# Patient Record
Sex: FEMALE | Race: BLACK OR AFRICAN AMERICAN | NOT HISPANIC OR LATINO | Employment: PART TIME | URBAN - NONMETROPOLITAN AREA
[De-identification: names, ages, dates, MRNs, and addresses within clinical notes are randomized per-mention and may not be internally consistent; named-entity substitution may affect disease eponyms.]

---

## 2018-05-04 ENCOUNTER — HOSPITAL ENCOUNTER (INPATIENT)
Facility: HOSPITAL | Age: 23
LOS: 2 days | Discharge: HOME | End: 2018-05-06
Attending: OBSTETRICS & GYNECOLOGY | Admitting: OBSTETRICS & GYNECOLOGY

## 2018-05-04 PROBLEM — O47.9 THREATENED LABOR AT TERM: Status: RESOLVED | Noted: 2018-05-04 | Resolved: 2018-05-04

## 2018-05-04 PROBLEM — O47.9 THREATENED LABOR AT TERM: Status: ACTIVE | Noted: 2018-05-04

## 2018-05-04 LAB
ABO GROUP BLD: NORMAL
AMPHET+METHAMPHET UR QL: NEGATIVE
ANTI-E: NORMAL
BARBITURATES UR QL SCN: NEGATIVE
BENZODIAZ UR QL SCN: NEGATIVE
BLD GP AB SCN SERPL QL: POSITIVE
CANNABINOIDS SERPL QL: POSITIVE
COCAINE UR QL: NEGATIVE
DEPRECATED RDW RBC AUTO: 39.4 FL (ref 36.4–46.3)
ERYTHROCYTE [DISTWIDTH] IN BLOOD BY AUTOMATED COUNT: 13.7 % (ref 11.5–14.5)
HCT VFR BLD AUTO: 28.5 % (ref 35–45)
HGB BLD-MCNC: 9.5 G/DL (ref 12–15.5)
Lab: NORMAL
MCH RBC QN AUTO: 26.3 PG (ref 26.5–34)
MCHC RBC AUTO-ENTMCNC: 33.3 G/DL (ref 31.4–36)
MCV RBC AUTO: 78.9 FL (ref 80–98)
METHADONE UR QL SCN: NEGATIVE
OPIATES UR QL: NEGATIVE
OXYCODONE UR QL SCN: NEGATIVE
PLATELET # BLD AUTO: 200 10*3/MM3 (ref 150–450)
PMV BLD AUTO: 11 FL (ref 8–12)
RBC # BLD AUTO: 3.61 10*6/MM3 (ref 3.77–5.16)
RH BLD: POSITIVE
T&S EXPIRATION DATE: NORMAL
WBC NRBC COR # BLD: 11.8 10*3/MM3 (ref 3.2–9.8)

## 2018-05-04 PROCEDURE — 80307 DRUG TEST PRSMV CHEM ANLYZR: CPT | Performed by: ADVANCED PRACTICE MIDWIFE

## 2018-05-04 PROCEDURE — 86900 BLOOD TYPING SEROLOGIC ABO: CPT | Performed by: ADVANCED PRACTICE MIDWIFE

## 2018-05-04 PROCEDURE — 86870 RBC ANTIBODY IDENTIFICATION: CPT | Performed by: ADVANCED PRACTICE MIDWIFE

## 2018-05-04 PROCEDURE — 86901 BLOOD TYPING SEROLOGIC RH(D): CPT | Performed by: ADVANCED PRACTICE MIDWIFE

## 2018-05-04 PROCEDURE — 86850 RBC ANTIBODY SCREEN: CPT | Performed by: ADVANCED PRACTICE MIDWIFE

## 2018-05-04 PROCEDURE — 59410 OBSTETRICAL CARE: CPT | Performed by: ADVANCED PRACTICE MIDWIFE

## 2018-05-04 PROCEDURE — 85027 COMPLETE CBC AUTOMATED: CPT | Performed by: ADVANCED PRACTICE MIDWIFE

## 2018-05-04 RX ORDER — SODIUM CHLORIDE, SODIUM LACTATE, POTASSIUM CHLORIDE, CALCIUM CHLORIDE 600; 310; 30; 20 MG/100ML; MG/100ML; MG/100ML; MG/100ML
125 INJECTION, SOLUTION INTRAVENOUS CONTINUOUS
Status: DISCONTINUED | OUTPATIENT
Start: 2018-05-04 | End: 2018-05-05

## 2018-05-04 RX ORDER — IBUPROFEN 800 MG/1
800 TABLET ORAL EVERY 6 HOURS PRN
Status: DISCONTINUED | OUTPATIENT
Start: 2018-05-04 | End: 2018-05-06

## 2018-05-04 RX ORDER — DOCUSATE SODIUM 100 MG/1
100 CAPSULE, LIQUID FILLED ORAL 2 TIMES DAILY PRN
Status: DISCONTINUED | OUTPATIENT
Start: 2018-05-04 | End: 2018-05-06

## 2018-05-04 RX ORDER — SODIUM CHLORIDE 0.9 % (FLUSH) 0.9 %
1-10 SYRINGE (ML) INJECTION AS NEEDED
Status: DISCONTINUED | OUTPATIENT
Start: 2018-05-04 | End: 2018-05-06

## 2018-05-04 RX ORDER — LIDOCAINE HYDROCHLORIDE 10 MG/ML
5 INJECTION, SOLUTION EPIDURAL; INFILTRATION; INTRACAUDAL; PERINEURAL AS NEEDED
Status: DISCONTINUED | OUTPATIENT
Start: 2018-05-04 | End: 2018-05-04 | Stop reason: HOSPADM

## 2018-05-04 RX ORDER — SODIUM CHLORIDE 0.9 % (FLUSH) 0.9 %
1-10 SYRINGE (ML) INJECTION AS NEEDED
Status: DISCONTINUED | OUTPATIENT
Start: 2018-05-04 | End: 2018-05-04 | Stop reason: HOSPADM

## 2018-05-04 RX ORDER — ACETAMINOPHEN 325 MG/1
650 TABLET ORAL EVERY 4 HOURS PRN
Status: DISCONTINUED | OUTPATIENT
Start: 2018-05-04 | End: 2018-05-06

## 2018-05-04 RX ORDER — OXYTOCIN/RINGER'S LACTATE 20/1000 ML
1000 PLASTIC BAG, INJECTION (ML) INTRAVENOUS CONTINUOUS
Status: ACTIVE | OUTPATIENT
Start: 2018-05-04 | End: 2018-05-04

## 2018-05-04 RX ORDER — SODIUM CHLORIDE, SODIUM LACTATE, POTASSIUM CHLORIDE, CALCIUM CHLORIDE 600; 310; 30; 20 MG/100ML; MG/100ML; MG/100ML; MG/100ML
INJECTION, SOLUTION INTRAVENOUS
Status: DISPENSED
Start: 2018-05-04 | End: 2018-05-05

## 2018-05-04 RX ORDER — ACETAMINOPHEN 325 MG/1
650 TABLET ORAL EVERY 4 HOURS PRN
Status: DISCONTINUED | OUTPATIENT
Start: 2018-05-04 | End: 2018-05-04 | Stop reason: HOSPADM

## 2018-05-04 RX ADMIN — SODIUM CHLORIDE, POTASSIUM CHLORIDE, SODIUM LACTATE AND CALCIUM CHLORIDE 125 ML/HR: 600; 310; 30; 20 INJECTION, SOLUTION INTRAVENOUS at 20:00

## 2018-05-04 RX ADMIN — Medication 1000 ML/HR: at 20:25

## 2018-05-05 LAB
HCT VFR BLD AUTO: 28.8 % (ref 35–45)
HGB BLD-MCNC: 9.5 G/DL (ref 12–15.5)

## 2018-05-05 PROCEDURE — 85014 HEMATOCRIT: CPT | Performed by: ADVANCED PRACTICE MIDWIFE

## 2018-05-05 PROCEDURE — 85018 HEMOGLOBIN: CPT | Performed by: ADVANCED PRACTICE MIDWIFE

## 2018-05-05 RX ORDER — LANOLIN 100 %
OINTMENT (GRAM) TOPICAL AS NEEDED
Status: DISCONTINUED | OUTPATIENT
Start: 2018-05-05 | End: 2018-05-06

## 2018-05-05 RX ORDER — FERROUS SULFATE TAB EC 324 MG (65 MG FE EQUIVALENT) 324 (65 FE) MG
324 TABLET DELAYED RESPONSE ORAL
Status: DISCONTINUED | OUTPATIENT
Start: 2018-05-05 | End: 2018-05-06

## 2018-05-05 RX ORDER — MISOPROSTOL 200 UG/1
800 TABLET ORAL AS NEEDED
Status: DISCONTINUED | OUTPATIENT
Start: 2018-05-05 | End: 2018-05-06

## 2018-05-05 RX ORDER — ACETAMINOPHEN 325 MG/1
650 TABLET ORAL EVERY 4 HOURS PRN
Status: DISCONTINUED | OUTPATIENT
Start: 2018-05-05 | End: 2018-05-06

## 2018-05-05 RX ORDER — METHYLERGONOVINE MALEATE 0.2 MG/ML
200 INJECTION INTRAVENOUS ONCE AS NEEDED
Status: DISCONTINUED | OUTPATIENT
Start: 2018-05-05 | End: 2018-05-06

## 2018-05-05 RX ORDER — CARBOPROST TROMETHAMINE 250 UG/ML
250 INJECTION, SOLUTION INTRAMUSCULAR AS NEEDED
Status: DISCONTINUED | OUTPATIENT
Start: 2018-05-05 | End: 2018-05-06

## 2018-05-05 RX ADMIN — BENZOCAINE AND LEVOMENTHOL: 200; 5 SPRAY TOPICAL at 09:52

## 2018-05-05 RX ADMIN — IBUPROFEN 800 MG: 800 TABLET ORAL at 21:03

## 2018-05-05 RX ADMIN — FERROUS SULFATE TAB EC 324 MG (65 MG FE EQUIVALENT) 324 MG: 324 (65 FE) TABLET DELAYED RESPONSE at 13:58

## 2018-05-05 RX ADMIN — IBUPROFEN 800 MG: 800 TABLET ORAL at 14:01

## 2018-05-05 RX ADMIN — FERROUS SULFATE TAB EC 324 MG (65 MG FE EQUIVALENT) 324 MG: 324 (65 FE) TABLET DELAYED RESPONSE at 21:01

## 2018-05-05 RX ADMIN — HYDROCORTISONE 2.5% 1 APPLICATION: 25 CREAM TOPICAL at 18:28

## 2018-05-05 RX ADMIN — FERROUS SULFATE TAB EC 324 MG (65 MG FE EQUIVALENT) 324 MG: 324 (65 FE) TABLET DELAYED RESPONSE at 09:52

## 2018-05-05 RX ADMIN — Medication: at 18:28

## 2018-05-05 NOTE — H&P
"Isidra Gtz  : 1995  MRN: [id number]  CSN: [id number]    History and Physical    Subjective   Isidra Gtz is a 23 y.o. year old  with an Estimated Date of Delivery: 18 currently at 37w5d presenting with regular contractions occuring every 2-3.    Prenatal care has been with [facility].  It has been complicated by LAte/Inadequate prenatal care.    Obstetric History       T0      L2     SAB0   TAB0   Ectopic0   Molar0   Multiple0   Live Births2       # Outcome Date GA Lbr Tien/2nd Weight Sex Delivery Anes PTL Lv   3 Current            2  03/10/17    F Vag-Spont None Y ORALIA   1  04/10/16    M Vag-Spont None Y ORALIA          No past medical history on file.    No past surgical history on file.    Prior to Admission medications    Medication Sig Start Date End Date Taking? Authorizing Provider   Prenatal Multivit-Min-Fe-FA (PRENATAL VITAMINS) 0.8 MG tablet Take 1 tablet by mouth Daily.   Yes Historical Provider, MD       No Known Allergies    No family history on file.    Social History   Substance Use Topics   • Smoking status: Never Smoker   • Smokeless tobacco: Never Used   • Alcohol use No       Review of Systems   Constitutional: Negative.    Respiratory: Negative.    Cardiovascular: Negative.    Gastrointestinal: Negative.    Genitourinary: Negative.    Neurological: Negative.    Psychiatric/Behavioral: Negative.              Objective     /78 (BP Location: Right arm, Patient Position: Lying)   Pulse 93   Temp 98.2 °F (36.8 °C) (Oral)   Resp 18   Ht 154.9 cm (61\")   Wt 63.5 kg (140 lb)   SpO2 100%   BMI 26.45 kg/m²       Physical Exam   Constitutional: She is oriented to person, place, and time. She appears well-developed and well-nourished.   Genitourinary: Vagina normal and uterus normal.   HENT:   Head: Normocephalic and atraumatic.   Eyes: Pupils are equal, round, and reactive to light.   Neck: Normal range of motion. " Neck supple.   Cardiovascular: Normal rate, regular rhythm, normal heart sounds and intact distal pulses.    Pulmonary/Chest: Effort normal and breath sounds normal.   Abdominal: Soft. Bowel sounds are normal.   Musculoskeletal: Normal range of motion.   Neurological: She is alert and oriented to person, place, and time. She has normal reflexes.   Skin: Skin is warm and dry.   Psychiatric: She has a normal mood and affect.       Prenatal Labs  Lab Results   Component Value Date    HGB 11.0 (L) 2016    HEPBSAG Negative 2015    ABO O 2015    RH POS 2015    [id number] Non Reactive 2015    HEPCVIRUSABY Negative 2015       Current Labs Reviewed   Pertinent labs reviewed.       Assessment   1. IUP at 37w5d  2. Group B strep status: unknown  3. Late/Inadequate prenatal care- 1 visit     Plan   1. SROM, large amount clear fluid @ 6   2. Anticipate     EDI Odell  2018  8:01 PM

## 2018-05-05 NOTE — PLAN OF CARE
Problem: Patient Care Overview  Goal: Plan of Care Review  Outcome: Ongoing (interventions implemented as appropriate)   05/05/18 0337   Coping/Psychosocial   Plan of Care Reviewed With patient     Goal: Individualization and Mutuality  Outcome: Ongoing (interventions implemented as appropriate)    Goal: Discharge Needs Assessment  Outcome: Ongoing (interventions implemented as appropriate)    Goal: Interprofessional Rounds/Family Conf  Outcome: Ongoing (interventions implemented as appropriate)      Problem: Breastfeeding (Adult,Obstetrics,Pediatric)  Goal: Signs and Symptoms of Listed Potential Problems Will be Absent, Minimized or Managed (Breastfeeding)  Outcome: Ongoing (interventions implemented as appropriate)      Problem: Postpartum (Vaginal Delivery) (Adult,Obstetrics,Pediatric)  Goal: Signs and Symptoms of Listed Potential Problems Will be Absent, Minimized or Managed (Postpartum)  Outcome: Ongoing (interventions implemented as appropriate)

## 2018-05-05 NOTE — PROGRESS NOTES
[facility]  Isidra Gtz  : 1995  MRN: [id number]  CSN: [id number]    Postpartum Day #1  Subjective   The patient has no complaints      Objective     Min/max vitals past 24 hours:   Temp  Min: 97.8 °F (36.6 °C)  Max: 98.2 °F (36.8 °C)  BP  Min: 115/73  Max: 135/64  Pulse  Min: 61  Max: 101  Pulse  Min: 61  Max: 101        Abdomen: soft, non-tender; bowel sounds normal; no masses   fundus firm and non-tender   Calves: Nontender, no cords palpable   Pelvic: deferred     Lab Results   Component Value Date    WBC 11.80 (H) 2018    HGB 9.5 (L) 2018    HCT 28.8 (L) 2018    MCV 78.9 (L) 2018     2018    RH Positive 2018    HEPBSAG Negative 2015        Assessment   1. Postpartum Day #1 S/P vaginal delivery  2. BF support     Plan   1. Continue routine postpartum care      This document has been electronically signed by EDI Odell on May 5, 2018 9:17 AM

## 2018-05-05 NOTE — PLAN OF CARE
Problem: Patient Care Overview  Goal: Plan of Care Review  Outcome: Ongoing (interventions implemented as appropriate)   05/05/18 8692   Coping/Psychosocial   Plan of Care Reviewed With patient   Plan of Care Review   Progress improving   OTHER   Outcome Summary vss; fundus firm, lochia rubra light; voids and ambulates. bonding well with infant.      Goal: Individualization and Mutuality  Outcome: Ongoing (interventions implemented as appropriate)    Goal: Discharge Needs Assessment  Outcome: Ongoing (interventions implemented as appropriate)    Goal: Interprofessional Rounds/Family Conf  Outcome: Ongoing (interventions implemented as appropriate)      Problem: Breastfeeding (Adult,Obstetrics,Pediatric)  Goal: Signs and Symptoms of Listed Potential Problems Will be Absent, Minimized or Managed (Breastfeeding)  Outcome: Ongoing (interventions implemented as appropriate)      Problem: Postpartum (Vaginal Delivery) (Adult,Obstetrics,Pediatric)  Goal: Signs and Symptoms of Listed Potential Problems Will be Absent, Minimized or Managed (Postpartum)  Outcome: Ongoing (interventions implemented as appropriate)

## 2018-05-05 NOTE — L&D DELIVERY NOTE
[facility]  Vaginal Delivery Note    Delivery     Delivery: Vaginal, Spontaneous Delivery     YOB: 2017    Time of Birth: 8:21 PM      Anesthesia: None     Delivering clinician: Dalila Laguna       Delivery narrative:  Patient is a now  who presented at 37w5d weeks gestation for regular contractions. Course of labor was uncomplicated. Cervix was 3-4 cm on admission. She received nothing for pain control in her labor. She had artificial  rupture of membranes at . Cervix progressed to complete at . Patient proceeded to a spontaneous vaginal delivery of a viable female infant at , over a intact perineum. No nuchal noted. Shoulders delivered without difficulty. Lusty cry. Infant placed on maternal abdomen. After cessation of pulsating, cord was double-clamped and cut with 3 vessels noted. Cord blood specimen was obtained and sent to lab for cord blood profile. Placenta delivered spontaneously at  in Denise position. Fundus was firm to massage. Estimated blood loss: Est. Blood Loss (mL): 225 mL (Filed from Delivery Summary) (18). Inspection of vaginal wall, perineum, and vestibule revealed  no laceration. APGARS: 8   @ 1 minute / 9   @ 5 minutes. Infant weight: No birth weight on file. . Mother and baby enter recovery in stable condition.       Complications  none    Infant    Findings: female  infant     Infant observations: Weight: No birth weight on file.   Length:    in  Observations/Comments:         Apgars: 8   @ 1 minute /    9   @ 5 minutes     Placenta, Cord, and Fluid    Placenta delivered  Spontaneous  at     8:25 PM     Cord: 3 vessels  present.   Nuchal Cord?  no   Cord blood obtained: Yes    Cord gases obtained:  No      Repair    Episiotomy: None    Lacerations: No   Estimated Blood Loss: Est. Blood Loss (mL): 225 mL (Filed from Delivery Summary) (18)     Suture used for repair:      Disposition  Mother to Remain in LD  in stable  condition currently.  Baby to remains with mom  in stable condition currently.          This document has been electronically signed by EDI Odell on May 4, 2018 8:39 PM          This document has been electronically signed by EDI Odell on May 4, 2018 8:39 PM

## 2018-05-06 VITALS
WEIGHT: 140 LBS | OXYGEN SATURATION: 98 % | BODY MASS INDEX: 26.43 KG/M2 | HEIGHT: 61 IN | DIASTOLIC BLOOD PRESSURE: 74 MMHG | TEMPERATURE: 98.2 F | RESPIRATION RATE: 16 BRPM | SYSTOLIC BLOOD PRESSURE: 132 MMHG | HEART RATE: 77 BPM

## 2018-05-06 RX ORDER — IBUPROFEN 800 MG/1
800 TABLET ORAL EVERY 8 HOURS PRN
Qty: 52 TABLET | Refills: 0 | Status: CANCELLED | OUTPATIENT
Start: 2018-05-06

## 2018-05-06 RX ORDER — LANOLIN 100 %
OINTMENT (GRAM) TOPICAL AS NEEDED
Status: CANCELLED
Start: 2018-05-06

## 2018-05-06 RX ORDER — ACETAMINOPHEN 325 MG/1
650 TABLET ORAL EVERY 4 HOURS PRN
Status: CANCELLED
Start: 2018-05-06

## 2018-05-06 RX ORDER — FERROUS SULFATE TAB EC 324 MG (65 MG FE EQUIVALENT) 324 (65 FE) MG
324 TABLET DELAYED RESPONSE ORAL
Qty: 90 TABLET | Refills: 10 | Status: SHIPPED | OUTPATIENT
Start: 2018-05-06 | End: 2018-05-17 | Stop reason: HOSPADM

## 2018-05-06 RX ORDER — FERROUS SULFATE TAB EC 324 MG (65 MG FE EQUIVALENT) 324 (65 FE) MG
324 TABLET DELAYED RESPONSE ORAL
Qty: 90 TABLET | Refills: 10 | Status: CANCELLED | OUTPATIENT
Start: 2018-05-06

## 2018-05-06 RX ORDER — FERROUS SULFATE TAB EC 324 MG (65 MG FE EQUIVALENT) 324 (65 FE) MG
324 TABLET DELAYED RESPONSE ORAL
Qty: 90 TABLET | Refills: 2 | Status: CANCELLED | OUTPATIENT
Start: 2018-05-06

## 2018-05-06 RX ORDER — IBUPROFEN 800 MG/1
800 TABLET ORAL EVERY 8 HOURS PRN
Qty: 52 TABLET | Refills: 0 | Status: SHIPPED | OUTPATIENT
Start: 2018-05-06 | End: 2018-05-17 | Stop reason: HOSPADM

## 2018-05-06 RX ADMIN — FERROUS SULFATE TAB EC 324 MG (65 MG FE EQUIVALENT) 324 MG: 324 (65 FE) TABLET DELAYED RESPONSE at 09:02

## 2018-05-06 NOTE — CONSULTS
SW received consult re: positive UDS on admission. Pt was positive on admission for THC. Baby's UDS negative. Pending meconium results. SW spoke with patient at bedside with FOB present in the bathroom. Pt agreeable for FOB to stay in room at time of conversation. Pt knew why SW was consulted. SW explained DCBS referral if meconium was positive. Informed her UDS on baby negative. She voiced understanding. Pt voiced that she did not take anything else that was not prx during pregnancy. SW informed her that SW will call with meconium results when they came back. Pt can be reached at [phone]. Pt had no other concerns. Pt has everything she needs for baby. Currently breastfeeding and bonding well with baby. SW will leave handoff for other SW to follow meconium results.    JIM Tan

## 2018-05-06 NOTE — DISCHARGE INSTR - ACTIVITY
Notify Physician or CNM of heavy bleeding, passing clots or foul odor to your discharge  Temp above 100.4. Burning on urination, gapping or drainage from incision or episiotomy, pain not relieved by your pain meds, redness or streaking in your breast or pain in your legs.  Take medication as prescribed  Continue taking your iron or vitamins till your refills run out or as long as you are breastfeeding  Take several rest periods during the day  Baby blues are normal, and may be present around the 3rd-4th day, if they last longer than 2-3 days contact your physician.  Pelvic Rest- No douching tampons or intercourse for 6 weeks  No lifting anything heavier than the baby  Wear a good supportive bra 24 hrs a day to prevent engorgement.  No driving the 1st 2 weeks or as long as you are taking pain meds

## 2018-05-07 NOTE — PAYOR COMM NOTE
"Sheryl Remy (23 y.o. Female)     Date of Birth Social Security Number Address Home Phone MRN    1995  [address] [phone] [id number]    Gnosticism Marital Status          None Single       Admission Date Admission Type Admitting Provider Attending Provider Department, Room/Bed    5/4/18 Elective Cayden Davila MD  [facility] MOTHER BABY, M754/1    Discharge Date Discharge Disposition Discharge Destination        5/6/2018 Home or Self Care Home             Attending Provider:  (none)   Allergies:  No Known Allergies    Isolation:  None   Infection:  None   Code Status:  Prior    Ht:  154.9 cm (61\")   Wt:  63.5 kg (140 lb)    Admission Cmt:  None   Principal Problem:  None                Active Insurance as of 5/4/2018     Primary Coverage     Payor Plan Insurance Group Employer/Plan Group    [employer] [place] [employer]      Payor Plan Address Payor Plan Phone Number Effective From Effective To    [address] [address] [phone] 5/4/2018     [place] [address]       Subscriber Name Subscriber Birth Date Member ID       SHERYL REMY 1995 [id number]                 Emergency Contacts      (Rel.) Home Phone Work Phone Mobile Phone    RandallKendrick (Significant Other) [phone] -- [phone]        Evelin Moreno RNCM  [facility]  [phone]   Phone  [phone]    Fax            ICU Vital Signs     Row Name 05/06/18 1021 05/06/18 0902 05/06/18 0530 05/05/18 2059 05/05/18 1824       Vitals    Temp  -- 98.2 °F (36.8 °C) 98.4 °F (36.9 °C) 97 °F (36.1 °C) 97.7 °F (36.5 °C)    Temp src  -- Temporal Artery  Oral Oral Oral    Pulse  -- 77 65 70 65    Heart Rate Source  -- Monitor Monitor Monitor Monitor    Resp  -- 16 16 18 16    Resp Rate Source  --  -- Visual Visual Visual    BP  -- 132/74 129/73 134/75 137/67    BP Location  -- Right arm Right arm Left arm Right arm    BP Method  -- Automatic " "Automatic Automatic Automatic    Patient Position  -- Sitting Sitting Sitting Sitting       Oxygen Therapy    SpO2  -- 98 % 98 % 98 % 99 %    Pulse Oximetry Type  --  -- Intermittent Intermittent Intermittent    Device (Oxygen Therapy) room air room air room air room air room air    Row Name 05/05/18 1357 05/05/18 0950 05/05/18 0900 05/05/18 0516 05/04/18 2249       Vitals    Temp 97.9 °F (36.6 °C) 97.5 °F (36.4 °C)  -- 98.1 °F (36.7 °C) 98 °F (36.7 °C)    Temp src  -- Oral  -- Oral Oral    Pulse 65 69  -- 70 76    Heart Rate Source Monitor Monitor  -- Monitor Monitor    Resp 18 16  -- 16 18    Resp Rate Source Visual Visual  -- Visual Monitor    /90 112/73  -- 115/73 116/69    BP Location Right arm Left arm  --  -- Left arm    BP Method Automatic Automatic  --  -- Automatic    Patient Position Sitting Sitting  --  -- Lying       Oxygen Therapy    SpO2 98 % 98 %  -- 98 % 99 %    Pulse Oximetry Type  --  --  -- Intermittent  --    Device (Oxygen Therapy) room air room air room air room air  --    Row Name 05/04/18 2226 05/04/18 2212 05/04/18 2156 05/04/18 2141 05/04/18 2126       Vitals    Pulse 75 77 69 61 77    /84 133/82 119/68 128/72 125/59    Row Name 05/04/18 2113 05/04/18 2056 05/04/18 2041 05/04/18 2028 05/04/18 2014       Vitals    Temp 97.8 °F (36.6 °C)  --  --  --  --    Temp src Oral  --  --  --  --    Pulse 86 83 94 101 97    Heart Rate Source Monitor  --  --  --  --    Resp 18  --  --  --  --    Resp Rate Source Visual  --  --  --  --    /64 127/64 129/62 134/72 133/75    BP Location Right arm  --  --  --  --    BP Method Automatic  --  --  --  --    Patient Position Sitting  --  --  --  --    Row Name 05/04/18 1923 05/04/18 1922                Height and Weight    Height  -- 154.9 cm (61\")       Height Method  -- Stated       Weight  -- 63.5 kg (140 lb)       Weight Method  -- Stated       Ideal Body Weight (IBW) (kg)  -- 48.15       BSA (Calculated - sq m)  -- 1.62 sq meters       " BMI (Calculated)  -- 26.5       Weight in (lb) to have BMI = 25  -- 132          Vitals    Temp  -- 98.2 °F (36.8 °C)       Temp src  -- Oral       Pulse 98 93       Heart Rate Source  -- Monitor       Resp  -- 18       Resp Rate Source  -- Visual       BP  -- 130/78       BP Location  -- Right arm       BP Method  -- Automatic       Patient Position  -- Lying          Oxygen Therapy    SpO2 100 % 100 %           Hospital Medications (all)       Dose Frequency Start End    Oxytocin-Lactated Ringers (PITOCIN) 20 UNIT/L in lactated Ringer's 1000 mL IVPB 1,000 mL/hr Continuous 5/4/2018 5/4/2018    Sig - Route: Infuse 20 Units/hr into a venous catheter Continuous. - Intravenous    acetaminophen (TYLENOL) tablet 650 mg (Discontinued) 650 mg Every 4 Hours PRN 5/4/2018 5/4/2018    Sig - Route: Take 2 tablets by mouth Every 4 (Four) Hours As Needed for Mild Pain  or Headache. - Oral    Reason for Discontinue: Patient Transfer    acetaminophen (TYLENOL) tablet 650 mg (Discontinued) 650 mg Every 4 Hours PRN 5/5/2018 5/6/2018    Sig - Route: Take 2 tablets by mouth Every 4 (Four) Hours As Needed for Mild Pain  or Headache. - Oral    acetaminophen (TYLENOL) tablet 650 mg (Discontinued) 650 mg Every 4 Hours PRN 5/4/2018 5/6/2018    Sig - Route: Take 2 tablets by mouth Every 4 (Four) Hours As Needed for Mild Pain . - Oral    benzocaine-lanolin-aloe vera (DERMOPLAST) 20-0.5 % topical spray (Discontinued)  As Needed 5/4/2018 5/6/2018    Sig - Route: Apply  topically As Needed for Mild Pain  (perineal pain). - Topical    carboprost (HEMABATE) injection 250 mcg (Discontinued) 250 mcg As Needed 5/5/2018 5/6/2018    Sig - Route: Inject 1 mL into the shoulder, thigh, or buttocks As Needed (hemorrhage). - Intramuscular    docusate sodium (COLACE) capsule 100 mg (Discontinued) 100 mg 2 Times Daily PRN 5/4/2018 5/6/2018    Sig - Route: Take 1 capsule by mouth 2 (Two) Times a Day As Needed for Constipation. - Oral    ferrous sulfate EC  tablet 324 mg (Discontinued) 324 mg 3 Times Daily With Meals 5/5/2018 5/6/2018    Sig - Route: Take 1 tablet by mouth 3 (Three) Times a Day With Meals. - Oral    hydrocortisone (ANUSOL-HC) 2.5 % rectal cream 1 application (Discontinued) 1 application As Needed 5/4/2018 5/6/2018    Sig - Route: Insert 1 application into the rectum As Needed for Hemorrhoids. - Rectal    ibuprofen (ADVIL,MOTRIN) tablet 800 mg (Discontinued) 800 mg Every 6 Hours PRN 5/4/2018 5/6/2018    Sig - Route: Take 1 tablet by mouth Every 6 (Six) Hours As Needed for Mild Pain . - Oral    lactated ringers bolus 1,000 mL (Discontinued) 1,000 mL Once As Needed 5/4/2018 5/4/2018    Sig - Route: Infuse 1,000 mL into a venous catheter 1 (One) Time As Needed (epidural). - Intravenous    Reason for Discontinue: Patient Transfer    lactated ringers infusion (Discontinued) 125 mL/hr Continuous 5/4/2018 5/5/2018    Sig - Route: Infuse 125 mL/hr into a venous catheter Continuous. - Intravenous    lanolin ointment (Discontinued)  As Needed 5/5/2018 5/6/2018    Sig - Route: Apply  topically As Needed for Dry Skin. - Topical    lidocaine PF 1% (XYLOCAINE) injection 5 mL (Discontinued) 5 mL As Needed 5/4/2018 5/4/2018    Sig - Route: Inject 5 mL into the skin As Needed (IV starts). - Intradermal    Reason for Discontinue: Patient Transfer    magnesium hydroxide (MILK OF MAGNESIA) suspension 2400 mg/10mL 10 mL (Discontinued) 10 mL Daily PRN 5/4/2018 5/6/2018    Sig - Route: Take 10 mL by mouth Daily As Needed for Constipation. - Oral    methylergonovine (METHERGINE) injection 200 mcg (Discontinued) 200 mcg Once As Needed 5/5/2018 5/6/2018    Sig - Route: Inject 1 mL into the shoulder, thigh, or buttocks 1 (One) Time As Needed (hemorrhage). - Intramuscular    misoprostol (CYTOTEC) tablet 800 mcg (Discontinued) 800 mcg As Needed 5/5/2018 5/6/2018    Sig - Route: Insert 4 tablets into the rectum As Needed (Postpartum Hemorrhage). - Rectal    pramoxine-hydrocortisone  1-1 % foam (Discontinued)  As Needed 2018    Sig - Route: Apply  topically As Needed for Hemorrhoids. - Topical    sodium chloride 0.9 % flush 1-10 mL (Discontinued) 1-10 mL As Needed 2018    Sig - Route: Infuse 1-10 mL into a venous catheter As Needed for Line Care. - Intravenous    Reason for Discontinue: Patient Transfer    sodium chloride 0.9 % flush 1-10 mL (Discontinued) 1-10 mL As Needed 2018    Sig - Route: Infuse 1-10 mL into a venous catheter As Needed for Line Care. - Intravenous    Tdap (BOOSTRIX) injection 0.5 mL (Discontinued) 0.5 mL During Hospitalization 2018    Sig - Route: Inject 0.5 mL into the shoulder, thigh, or buttocks During Hospitalization for Immunization. - Intramuscular             Operative/Procedure Notes (last 7 days) (Notes from 2018  7:18 AM through 2018  7:18 AM)      Dalila Laguna, APRN at 2018  8:39 PM          [facility]  Vaginal Delivery Note    Delivery     Delivery: Vaginal, Spontaneous Delivery     YOB: 2017    Time of Birth: 8:21 PM      Anesthesia: None     Delivering clinician: Dalila Laguna       Delivery narrative:  Patient is a now  who presented at 37w5d weeks gestation for regular contractions. Course of labor was uncomplicated. Cervix was 3-4 cm on admission. She received nothing for pain control in her labor. She had artificial  rupture of membranes at . Cervix progressed to complete at . Patient proceeded to a spontaneous vaginal delivery of a viable female infant at , over a intact perineum. No nuchal noted. Shoulders delivered without difficulty. Lusty cry. Infant placed on maternal abdomen. After cessation of pulsating, cord was double-clamped and cut with 3 vessels noted. Cord blood specimen was obtained and sent to lab for cord blood profile. Placenta delivered spontaneously at  in Denise position. Fundus was firm to massage. Estimated blood loss:  Est. Blood Loss (mL): 225 mL (Filed from Delivery Summary) (18). Inspection of vaginal wall, perineum, and vestibule revealed  no laceration. APGARS: 8   @ 1 minute / 9   @ 5 minutes. Infant weight: No birth weight on file. . Mother and baby enter recovery in stable condition.       Complications  none    Infant    Findings: female  infant     Infant observations: Weight: No birth weight on file.   Length:    in  Observations/Comments:         Apgars: 8   @ 1 minute /    9   @ 5 minutes     Placenta, Cord, and Fluid    Placenta delivered  Spontaneous  at     8:25 PM     Cord: 3 vessels  present.   Nuchal Cord?  no   Cord blood obtained: Yes    Cord gases obtained:  No      Repair    Episiotomy: None    Lacerations: No   Estimated Blood Loss: Est. Blood Loss (mL): 225 mL (Filed from Delivery Summary) (18)     Suture used for repair:      Disposition  Mother to Remain in LD  in stable condition currently.  Baby to remains with mom  in stable condition currently.          This document has been electronically signed by EDI Odell on May 4, 2018 8:39 PM          This document has been electronically signed by EDI Odell on May 4, 2018 8:39 PM        Electronically signed by EDI Odell at 2018  9:06 AM          Physician Progress Notes (last 7 days) (Notes from 2018  7:19 AM through 2018  7:19 AM)      EDI Odell at 2018  9:16 AM          [facility]  Isdirabrandy Rahmanron Gtz  : 1995  MRN: [id number]  CSN: [id number]    Postpartum Day #1  Subjective   The patient has no complaints     Objective     Min/max vitals past 24 hours:   Temp  Min: 97.8 °F (36.6 °C)  Max: 98.2 °F (36.8 °C)  BP  Min: 115/73  Max: 135/64  Pulse  Min: 61  Max: 101  Pulse  Min: 61  Max: 101        Abdomen: soft, non-tender; bowel sounds normal; no masses   fundus firm and non-tender   Calves: Nontender, no cords palpable   Pelvic: deferred     Lab  Results   Component Value Date    WBC 11.80 (H) 05/04/2018    HGB 9.5 (L) 05/05/2018    HCT 28.8 (L) 05/05/2018    MCV 78.9 (L) 05/04/2018     05/04/2018    RH Positive 05/04/2018    HEPBSAG Negative 11/09/2015       Assessment   1. Postpartum Day #1 S/P vaginal delivery  2. BF support    Plan   1. Continue routine postpartum care      This document has been electronically signed by DEI Odell on May 5, 2018 9:17 AM            Electronically signed by EDI Odell at 5/5/2018  9:17 AM       Medical Student Notes (last 7 days) (Notes from 4/30/2018  7:19 AM through 5/7/2018  7:19 AM)     No notes of this type exist for this encounter.        Consult Notes (last 7 days) (Notes from 04/30/18 through 05/07/18)     No notes of this type exist for this encounter.

## 2018-05-07 NOTE — PAYOR COMM NOTE
"Sheryl Remy (23 y.o. Female)     Date of Birth Social Security Number Address Home Phone MRN    1995  [address] [address] [phone] [id number]    [place] Marital Status          None Single       Admission Date Admission Type Admitting Provider Attending Provider Department, Room/Bed    5/4/18 Elective Cayden Davila MD  [facility] MOTHER BABY, M754/1    Discharge Date Discharge Disposition Discharge Destination        5/6/2018 Home or Self Care Home             Attending Provider:  (none)   Allergies:  No Known Allergies    Isolation:  None   Infection:  None   Code Status:  Prior    Ht:  154.9 cm (61\")   Wt:  63.5 kg (140 lb)    Admission Cmt:  None   Principal Problem:  None                Active Insurance as of 5/4/2018     Primary Coverage     Payor Plan Insurance Group Employer/Plan Group    [employer] [place]      Payor Plan Address Payor Plan Phone Number Effective From Effective To    [address] [address] [phone] 5/4/2018     [place] [address]       Subscriber Name Subscriber Birth Date Member ID       SHERYL REMY 1995 [id number]                 Emergency Contacts      (Rel.) Home Phone Work Phone Mobile Phone    Kendrick Pereira (Significant Other) [phone] -- [phone]        Evelin Moreno RNCM  [facility]  [phone]  Delivery Summary      "

## 2018-05-17 ENCOUNTER — OFFICE VISIT (OUTPATIENT)
Dept: OBSTETRICS AND GYNECOLOGY | Facility: CLINIC | Age: 23
End: 2018-05-17

## 2018-05-17 VITALS
HEIGHT: 61 IN | WEIGHT: 120 LBS | SYSTOLIC BLOOD PRESSURE: 108 MMHG | DIASTOLIC BLOOD PRESSURE: 73 MMHG | BODY MASS INDEX: 22.66 KG/M2

## 2018-05-17 DIAGNOSIS — Z30.09 ENCOUNTER FOR COUNSELING REGARDING CONTRACEPTION: ICD-10-CM

## 2018-05-17 PROCEDURE — 99024 POSTOP FOLLOW-UP VISIT: CPT | Performed by: ADVANCED PRACTICE MIDWIFE

## 2018-05-21 NOTE — PROGRESS NOTES
"Subjective   Chief Complaint   Patient presents with   • Postpartum Care     2 wk pp  w/ nothing for pain control of a female infant w/ a birth weight of 7-2.6     Isidra Gtz is a 23 y.o. year old  presenting for a postpartum visit.  She had a vaginal delivery.   Prenatal course was been complicated by Inadequate prenatal care.    Since delivery she has not been sexually active.  She does not have concerns about post-partum blues/depression.   She is breast feeding    The following portions of the patient's history were reviewed and updated as appropriate:current medications, allergies, past medical history, past social history and past surgical history    Smoking status: Never Smoker                                                              Smokeless tobacco: Never Used                          Review of Systems   Constitutional: Negative.    Respiratory: Negative.    Cardiovascular: Negative.    Gastrointestinal: Negative.    Genitourinary: Negative.    Musculoskeletal: Negative.    Skin: Negative.    Neurological: Negative.    Psychiatric/Behavioral: Negative.              Objective     /73   Ht 154.9 cm (61\")   Wt 54.4 kg (120 lb)   Breastfeeding? Yes   BMI 22.67 kg/m²     General:  well developed; well nourished  no acute distress   Abdomen: Not performed.   Pelvis: Not performed.        :    Problems Addressed this Visit     None      Visit Diagnoses     Routine postpartum follow-up    -  Primary    Encounter for counseling regarding contraception                Plan   1. Counseled on options for BC. Undecided, pamphlets given. Patient will call once she decides  2. RTC in 4 weeks for PP & initiation of BC         This note was electronically signed.    Dalila Laguna, APRN  May 21, 2018    "

## 2018-06-19 ENCOUNTER — OFFICE VISIT (OUTPATIENT)
Dept: OBSTETRICS AND GYNECOLOGY | Facility: CLINIC | Age: 23
End: 2018-06-19

## 2018-06-19 VITALS
DIASTOLIC BLOOD PRESSURE: 71 MMHG | HEART RATE: 65 BPM | WEIGHT: 126 LBS | HEIGHT: 61 IN | BODY MASS INDEX: 23.79 KG/M2 | SYSTOLIC BLOOD PRESSURE: 117 MMHG

## 2018-06-19 DIAGNOSIS — Z30.017 NEXPLANON INSERTION: ICD-10-CM

## 2018-06-19 PROCEDURE — 11981 INSERTION DRUG DLVR IMPLANT: CPT | Performed by: ADVANCED PRACTICE MIDWIFE

## 2018-06-19 PROCEDURE — 99024 POSTOP FOLLOW-UP VISIT: CPT | Performed by: ADVANCED PRACTICE MIDWIFE

## 2018-06-19 NOTE — PROGRESS NOTES
"Subjective   Chief Complaint   Patient presents with   • Postpartum Care     Isidra Gtz is a 23 y.o. year old  presenting for a postpartum visit.  She had a vaginal delivery.   Prenatal course was been complicated by minimal prenatal care.    Since delivery she has not been sexually active.  She does not have concerns about post-partum blues/depression.   She is breast feeding    The following portions of the patient's history were reviewed and updated as appropriate:current medications, allergies, past medical history, past social history and past surgical history    Smoking status: Never Smoker                                                              Smokeless tobacco: Never Used                          Review of Systems   Constitutional: Negative.    HENT: Negative.    Respiratory: Negative.    Cardiovascular: Negative.    Gastrointestinal: Negative.    Genitourinary: Negative.    Musculoskeletal: Negative.    Skin: Negative.    Neurological: Negative.    Psychiatric/Behavioral: Negative.              Objective     /71   Pulse 65   Ht 154.9 cm (61\")   Wt 57.2 kg (126 lb)   LMP 2018 (Approximate)   Breastfeeding? No   BMI 23.81 kg/m²     General:  well developed; well nourished  no acute distress   Abdomen: Not performed   Pelvis: Not performed.        :Nexplanon Insertion    Patient's last menstrual period was 2018 (approximate).    Date of procedure:  2018            NDC#: 3920-2221-59    Risks and benefits discussed? yes  All questions answered? yes  Consents given by the patient  Written consent obtained? yes    Local anesthesia used:  yes - 3 cc's of  Meds; anesthesia local: 1% lidocaine    Procedure documentation:    The upper left arm (non-dominant) was marked at the intended site of insertion.  Betadine was used to cleanse the skin.  Local anesthesia was injected.  The Nexplanon was placed subdermally without difficulty.  The devise was able to be palpated " in the arm by both myself and Isidra.  Sterile 4x4 was placed at site after pressure being held for 3 minutes & wrapped with Coban.    She tolerated the procedure well.  There were no complications.  EBL was minimal.    Post procedure instructions: Remove the wrapping in 24 hours and the steri-strips in 5 days.    Follow up needed: PRN    This note was electronically signed.      June 19, 2018        Problems Addressed this Visit     None      Visit Diagnoses     Routine postpartum follow-up    -  Primary    Nexplanon insertion                Plan   F/U in 1 year  June 19, 2018            This note was electronically signed.    Dalila Laguna, APRN  June 19, 2018

## 2021-07-14 ENCOUNTER — PROCEDURE VISIT (OUTPATIENT)
Dept: OBSTETRICS AND GYNECOLOGY | Facility: CLINIC | Age: 26
End: 2021-07-14

## 2021-07-14 VITALS
DIASTOLIC BLOOD PRESSURE: 76 MMHG | HEIGHT: 61 IN | WEIGHT: 116 LBS | BODY MASS INDEX: 21.9 KG/M2 | SYSTOLIC BLOOD PRESSURE: 110 MMHG

## 2021-07-14 DIAGNOSIS — Z30.46 ENCOUNTER FOR REMOVAL AND REINSERTION OF NEXPLANON: Primary | ICD-10-CM

## 2021-07-14 PROCEDURE — 11983 REMOVE/INSERT DRUG IMPLANT: CPT | Performed by: NURSE PRACTITIONER

## 2021-07-14 NOTE — PROGRESS NOTES
Nexplanon Removal and Reinsertion    Patient's last menstrual period was 06/30/2021.     Date of procedure:  7/14/2021    Risks and benefits discussed? yes  All questions answered? yes  Consents given by the patient  Written consent obtained? yes    Local anesthesia used:  yes - 3 cc's of  Meds; anesthesia local: None, 1% lidocaine with epinephrine    Procedure documentation:    The upper left arm (non-dominant) was marked at the intended site of removal.  The skin was cleansed with an antiseptic solution.  Local anesthesia was injected.  A small incision was created at the distal tip of the implant.  The implant was removed intact without difficulty.    The new Nexplanon was placed subdermally without difficulty proximal to the previous insertion site.The devise was able to be palpated in the arm by both myself and Isidra.  A clean 4x4 gauze was place over the site then wrapped.    She tolerated the procedure well.  There were no complications.  EBL was minimal.    Post procedure instructions: Remove the wrapping in 24 hours and cover with a band aid if still open.    Follow up needed: PRN    This note was electronically signed.    Lucrecia Gomez, EDI  July 14, 2021